# Patient Record
Sex: FEMALE
[De-identification: names, ages, dates, MRNs, and addresses within clinical notes are randomized per-mention and may not be internally consistent; named-entity substitution may affect disease eponyms.]

---

## 2019-07-08 ENCOUNTER — HOSPITAL ENCOUNTER (OUTPATIENT)
Dept: HOSPITAL 92 - BICULT | Age: 19
Discharge: HOME | End: 2019-07-08
Attending: ADVANCED PRACTICE MIDWIFE
Payer: MEDICAID

## 2019-07-08 DIAGNOSIS — N92.6: Primary | ICD-10-CM

## 2019-07-08 PROCEDURE — 76856 US EXAM PELVIC COMPLETE: CPT

## 2019-07-08 PROCEDURE — 93976 VASCULAR STUDY: CPT

## 2019-07-08 NOTE — ULT
TRANSABDOMINAL PELVIC ULTRASOUND WITH DOPPLER:

 

DATE: 7/8/2019.

 

PROVIDED CLINICAL HISTORY: 

Irregular menstrual period.

 

FINDINGS: 

The uterus measures about 7.6 x 3.3 x 2.5 cm and demonstrates a normal transabdominal sonographic ruma
earance.  The endometrial thickness is about 4-5 mm.

 

The right ovary measures about 2.6 x 1.6 x 1.8 cm and appears sonographically unremarkable.

 

The left ovary measures about 2.3 x 1.7 x 1.8 cm and appears sonographically unremarkable.

 

Color Doppler and spectral analysis of the ovarian waveforms demonstrates normal flow bilaterally.

 

No evidence for free pelvis fluid.

 

IMPRESSION: 

Unremarkable transabdominal pelvic ultrasound.

 

POS: OFF

## 2023-08-20 ENCOUNTER — HOSPITAL ENCOUNTER (EMERGENCY)
Dept: HOSPITAL 92 - CSHERS | Age: 23
Discharge: HOME | End: 2023-08-20
Payer: MEDICAID

## 2023-08-20 DIAGNOSIS — H60.91: Primary | ICD-10-CM

## 2023-08-20 PROCEDURE — 99282 EMERGENCY DEPT VISIT SF MDM: CPT
